# Patient Record
Sex: FEMALE | URBAN - METROPOLITAN AREA
[De-identification: names, ages, dates, MRNs, and addresses within clinical notes are randomized per-mention and may not be internally consistent; named-entity substitution may affect disease eponyms.]

---

## 2024-01-30 ENCOUNTER — TELEPHONE (OUTPATIENT)
Dept: GASTROENTEROLOGY | Facility: CLINIC | Age: 79
End: 2024-01-30

## 2024-01-30 NOTE — TELEPHONE ENCOUNTER
----- Message from Sofia Cao sent at 1/30/2024 11:01 AM CST -----  Contact: self  Patient is requesting a call back regarding records. Patient stated she was seen by Dr Vides. Please call back @ [phone] (home)

## 2024-01-30 NOTE — TELEPHONE ENCOUNTER
Patient wanted to know about who would be doing her next colonoscopy; she is an established [facility] patient. Patient's last colonoscopy was w/ [facility] 12/7/2021. She isn't due until 12/2026. Patient was advised that [facility] would be able to do the colonoscopy as long as she is not having any issues at the time. Patient voiced understanding. - dmp